# Patient Record
Sex: FEMALE
[De-identification: names, ages, dates, MRNs, and addresses within clinical notes are randomized per-mention and may not be internally consistent; named-entity substitution may affect disease eponyms.]

---

## 2022-08-08 ENCOUNTER — RX ONLY (RX ONLY)
Age: 42
End: 2022-08-08

## 2022-08-08 ENCOUNTER — OFFICE (OUTPATIENT)
Dept: URBAN - METROPOLITAN AREA CLINIC 76 | Facility: CLINIC | Age: 42
Setting detail: OPHTHALMOLOGY
End: 2022-08-08
Payer: COMMERCIAL

## 2022-08-08 DIAGNOSIS — H43.393: ICD-10-CM

## 2022-08-08 DIAGNOSIS — H16.223: ICD-10-CM

## 2022-08-08 DIAGNOSIS — H52.13: ICD-10-CM

## 2022-08-08 PROCEDURE — 92015 DETERMINE REFRACTIVE STATE: CPT | Performed by: OPHTHALMOLOGY

## 2022-08-08 PROCEDURE — 92004 COMPRE OPH EXAM NEW PT 1/>: CPT | Performed by: OPHTHALMOLOGY

## 2022-08-08 ASSESSMENT — KERATOMETRY
OD_K2POWER_DIOPTERS: 42.00
OD_K1POWER_DIOPTERS: 41.75
OS_AXISANGLE_DEGREES: 83
OS_K2POWER_DIOPTERS: 42.25
OD_AXISANGLE_DEGREES: 92
OS_K1POWER_DIOPTERS: 41.75

## 2022-08-08 ASSESSMENT — REFRACTION_MANIFEST
OS_CYLINDER: -0.75
OD_VA1: 20/20
OD_SPHERE: -0.75
OS_SPHERE: -0.50
OS_AXIS: 85
OS_VA1: 20/20
OD_CYLINDER: -0.75
OD_AXIS: 90

## 2022-08-08 ASSESSMENT — SUPERFICIAL PUNCTATE KERATITIS (SPK)
OS_SPK: 1+
OD_SPK: 1+

## 2022-08-08 ASSESSMENT — TONOMETRY
OD_IOP_MMHG: 15
OS_IOP_MMHG: 17

## 2022-08-08 ASSESSMENT — SPHEQUIV_DERIVED
OS_SPHEQUIV: -0.875
OS_SPHEQUIV: -0.875
OD_SPHEQUIV: -1.125
OD_SPHEQUIV: -1.125

## 2022-08-08 ASSESSMENT — AXIALLENGTH_DERIVED
OD_AL: 24.6746
OS_AL: 24.5188
OD_AL: 24.6746
OS_AL: 24.5188

## 2022-08-08 ASSESSMENT — REFRACTION_AUTOREFRACTION
OS_AXIS: 84
OS_CYLINDER: -0.75
OD_CYLINDER: -0.75
OD_SPHERE: -0.75
OD_AXIS: 88
OS_SPHERE: -0.50

## 2022-08-08 ASSESSMENT — REFRACTION_CURRENTRX
OS_AXIS: 89
OD_SPHERE: -0.50
OD_AXIS: 88
OD_CYLINDER: -0.75
OS_SPHERE: -0.50
OS_OVR_VA: 20/
OS_CYLINDER: -0.75
OD_OVR_VA: 20/

## 2022-08-08 ASSESSMENT — VISUAL ACUITY
OS_BCVA: 20/20
OD_BCVA: 20/20

## 2022-08-08 ASSESSMENT — CONFRONTATIONAL VISUAL FIELD TEST (CVF)
OD_FINDINGS: FULL
OS_FINDINGS: FULL

## 2022-08-08 ASSESSMENT — TEAR BREAK UP TIME (TBUT)
OS_TBUT: 1+
OD_TBUT: 1+

## 2022-11-23 PROBLEM — Z00.00 ENCOUNTER FOR PREVENTIVE HEALTH EXAMINATION: Status: ACTIVE | Noted: 2022-11-23

## 2022-12-05 ENCOUNTER — APPOINTMENT (OUTPATIENT)
Dept: PULMONOLOGY | Facility: CLINIC | Age: 42
End: 2022-12-05

## 2022-12-06 ENCOUNTER — APPOINTMENT (OUTPATIENT)
Dept: PULMONOLOGY | Facility: CLINIC | Age: 42
End: 2022-12-06

## 2022-12-06 ENCOUNTER — NON-APPOINTMENT (OUTPATIENT)
Age: 42
End: 2022-12-06

## 2022-12-06 VITALS
HEART RATE: 75 BPM | DIASTOLIC BLOOD PRESSURE: 58 MMHG | SYSTOLIC BLOOD PRESSURE: 96 MMHG | OXYGEN SATURATION: 98 % | HEIGHT: 62.99 IN | BODY MASS INDEX: 21.79 KG/M2 | TEMPERATURE: 97.9 F | WEIGHT: 123 LBS

## 2022-12-06 DIAGNOSIS — J31.0 CHRONIC RHINITIS: ICD-10-CM

## 2022-12-06 DIAGNOSIS — Z91.89 OTHER SPECIFIED PERSONAL RISK FACTORS, NOT ELSEWHERE CLASSIFIED: ICD-10-CM

## 2022-12-06 DIAGNOSIS — R06.02 SHORTNESS OF BREATH: ICD-10-CM

## 2022-12-06 DIAGNOSIS — R05.9 COUGH, UNSPECIFIED: ICD-10-CM

## 2022-12-06 PROCEDURE — 99204 OFFICE O/P NEW MOD 45 MIN: CPT

## 2022-12-06 RX ORDER — BUDESONIDE 1 MG/2ML
1 INHALANT ORAL TWICE DAILY
Qty: 2 | Refills: 2 | Status: ACTIVE | COMMUNITY
Start: 2022-12-06 | End: 1900-01-01

## 2022-12-06 NOTE — REVIEW OF SYSTEMS
[Nasal Congestion] : nasal congestion [Cough] : cough [Headache] : headache [Negative] : HEENT [Dyspnea] : dyspnea

## 2022-12-07 PROBLEM — Z86.79 PERSONAL HISTORY OF OTHER DISEASES OF THE CIRCULATORY SYSTEM: Chronic | Status: ACTIVE | Noted: 2022-11-29

## 2022-12-07 PROBLEM — Z86.11 PERSONAL HISTORY OF TUBERCULOSIS: Chronic | Status: ACTIVE | Noted: 2022-11-29

## 2022-12-07 PROBLEM — J84.10 PULMONARY FIBROSIS, UNSPECIFIED: Chronic | Status: ACTIVE | Noted: 2022-11-29

## 2022-12-07 PROBLEM — K51.90 ULCERATIVE COLITIS, UNSPECIFIED, WITHOUT COMPLICATIONS: Chronic | Status: ACTIVE | Noted: 2022-11-29

## 2022-12-07 PROBLEM — E80.6 OTHER DISORDERS OF BILIRUBIN METABOLISM: Chronic | Status: ACTIVE | Noted: 2022-11-29

## 2022-12-07 NOTE — PHYSICAL EXAM
[No Acute Distress] : no acute distress [Normal Oropharynx] : normal oropharynx [Normal Appearance] : normal appearance [No Neck Mass] : no neck mass [Normal Rate/Rhythm] : normal rate/rhythm [Normal S1, S2] : normal s1, s2 [No Murmurs] : no murmurs [No Resp Distress] : no resp distress [No Abnormalities] : no abnormalities [Benign] : benign [Normal Gait] : normal gait [No Clubbing] : no clubbing [No Cyanosis] : no cyanosis [No Edema] : no edema [FROM] : FROM [Normal Color/ Pigmentation] : normal color/ pigmentation [No Focal Deficits] : no focal deficits [Oriented x3] : oriented x3 [Normal Affect] : normal affect [TextBox_11] : mild erythema in the posterior pharynx, no exudates, no cobblestoning noted. Nasal mucosa is narrowed with erythema and discharge. Mild hypertrophy of middle turbinate, no polyps. Tonsils/adenoids are enlarged on one side [TextBox_68] : On forceful exhalation, there was a wheeze noted on the right side

## 2022-12-07 NOTE — HISTORY OF PRESENT ILLNESS
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 Brumfield\par takes vitamin D, tumeric, aloe vera herbs (supplements for liver, stomach)\par She is going to see a liver specialist referred by her PCP\par She wakes up 3 times during the night due to palpitations. She does not feel that she snores\par She sleeps alone\par She was concerned this was a cardiac issue. She wore a holter monitor, low heart rate\par She feels like she is forgetting to breath\par She has a pain in her chest, substernal\par She had COVID in July 2022. She had pain on the right side. Her symptoms are worse since having COVID, but were there before COVID\par She has reflux. She has taken medication before for reflux, which has helped\par She was taking pills for pain (cyst in her hip)\par She feels a lump in her throat\par She practices [employer].\par She saw a functional doctor in [place] [facility]. 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 Family history of stomach stomach cancer, both sides (grandparents)\par She believes her mother may have rheumatoid arthritis\par She bikes for 40 minutes about 5 times per week. Her HR goes from 120s-160s.

## 2022-12-07 NOTE — ADDENDUM
[FreeTextEntry1] : I, Dr. Mich Fisher, personally performed the evaluation and management services for this new patient.  This evaluation and management includes conducting the initial interview of the patient, performing the initial examination, assessing all medical conditions, reviewing all medical records available and establishing the plan of comprehensive care.  Today, my ACP, Ms. Angella Chang, participated in the process of patient evaluation and management.  She and I have discussed the management of the patient, and she understands the plan moving forwards

## 2022-12-07 NOTE — DISCUSSION/SUMMARY
[FreeTextEntry1] : ATTENDING'S SUMMARY:\par 12-6-22:\par no pallor, no icterus\par no JVD, no hepatojugular reflux, no HSM\par no cervical adenopathy, no supraclavicular adenopathy, mild erythema in the posterior pharynx, no exudates, no cobblestoning noted. Nasal mucosa is narrowed with erythema and discharge. Mild hypertrophy of middle turbinate, no polyps. Tonsils/adenoids are enlarged on one side. Mallampati IV\par normal s1/s2, no murmurs, rubs or gallops\par good air entry bilaterally, no wheezing, rhonchi or crackles. On forceful exhalation, there was a wheeze noted on the right side\par no cyanosis, no clubbing, no articular manifestations, no thickening of the skin\par no pedal edema

## 2022-12-07 NOTE — ASSESSMENT
[FreeTextEntry1] : 12-6-22:\par \par It was a pleasure to meet Robyn today in consultation. Her respiratory issues are summarized:\par \par 1. Cough\par Robyn complains of coughing, substernal chest pain and difficulty breathing. Her exercise tolerance is excellent, able to bike 6 miles in 40 minutes.\par \par Possible causes include:\par a. GERD. She complains of heartburn, lump in her throat and cough especially at night. She had worsening symptoms while she was on pain medication. It sounds like she was on a PPI for a period of time, which helped her symptoms.\par b. Asthma. She was diagnosed with asthma at age 18. She was on inhalers until age 21. She reports having allergies to foods. Denies triggers to changes in weather/seasons. Her pulmonary function tests are normal. There is no obstructive lung defect. We will order a methacholine challenge to confirm a diagnosis of asthma.\par c. Rhinitis. Nasal mucosa is narrowed with erythema and discharge. Mild hypertrophy of middle turbinate, no polyps. She currently uses nasal rinse with Flonase. We will add budesonide to nasal rinse twice daily. She can stop using Flonase\par d. Pulmonary fibrosis. She had COVID in July 2022. There is nothing on the CT to suggest fibrosis or scarring.\par \par Plan:\par - Methacholine challenge\par - When she comes in for MCT, check RAST, IgE level, eosinophil count\par - Although unlikely, we will check shortness of breath labs to rule out blood clot, diastolic dysfunction (pro-BNP, D-dimer)\par - If the above work up does not give an etiology her cough, we will plan to start her on Pantoprazole 40mg once a day for treatment of reflux\par - Referred to ENT, Dr. Yip for further evaluation of rhinitis\par \par 2. Pulmonary nodule\par Robyn has a 2mm lung nodule in the right middle lobe on chest CT done 11/11/22. She is a non smoker and does not have a family history of lung cancer. She does not require further follow up\par \par 3. Rule out obstructive sleep apnea\par She wakes up during the night with palpitations. She has a Mallampati IV. [place] is 20. We will order a home sleep study to evaluate for sleep apnea\par \par Return to clinic: 3 months

## 2022-12-07 NOTE — PROCEDURE
[FreeTextEntry1] : PFT 10/1/22\par FVC: 4.69 L (131%)--> 4.36 L (122%)  \par FEV1: 3.64 L (125%)--> 3.48 L (119%)  \par FEV1/FVC: 94%--> 97%\par [id number]%: 3.31 L/s (108%)--> 3.45 L/s (112%)\par T.91 L (122%)\par RV/T%\par DLCO: 28.1 (114%)

## 2022-12-07 NOTE — CONSULT LETTER
[Dear  ___] : Dear ~TIEN, [Consult Letter:] : I had the pleasure of evaluating your patient, [unfilled]. [Please see my note below.] : Please see my note below. [Consult Closing:] : Thank you very much for allowing me to participate in the care of this patient.  If you have any questions, please do not hesitate to contact me. [FreeTextEntry2] : Dr. Skylar Brumfield

## 2022-12-20 ENCOUNTER — OUTPATIENT (OUTPATIENT)
Dept: OUTPATIENT SERVICES | Facility: HOSPITAL | Age: 42
LOS: 1 days | Discharge: HOME | End: 2022-12-20

## 2022-12-20 DIAGNOSIS — R05.9 COUGH, UNSPECIFIED: ICD-10-CM

## 2022-12-20 PROCEDURE — 94070 EVALUATION OF WHEEZING: CPT | Mod: 26

## 2022-12-27 ENCOUNTER — APPOINTMENT (OUTPATIENT)
Dept: OTOLARYNGOLOGY | Facility: CLINIC | Age: 42
End: 2022-12-27

## 2022-12-27 VITALS
HEIGHT: 62 IN | DIASTOLIC BLOOD PRESSURE: 58 MMHG | HEART RATE: 56 BPM | TEMPERATURE: 98 F | BODY MASS INDEX: 23 KG/M2 | SYSTOLIC BLOOD PRESSURE: 95 MMHG | WEIGHT: 125 LBS

## 2022-12-27 DIAGNOSIS — G47.30 SLEEP APNEA, UNSPECIFIED: ICD-10-CM

## 2022-12-27 PROCEDURE — 99204 OFFICE O/P NEW MOD 45 MIN: CPT

## 2022-12-28 ENCOUNTER — LABORATORY RESULT (OUTPATIENT)
Age: 42
End: 2022-12-28

## 2022-12-28 PROBLEM — G47.30 SLEEP-DISORDERED BREATHING: Status: ACTIVE | Noted: 2022-12-28

## 2022-12-28 RX ORDER — AMITRIPTYLINE HYDROCHLORIDE 10 MG/1
10 TABLET, FILM COATED ORAL
Qty: 30 | Refills: 0 | Status: ACTIVE | COMMUNITY
Start: 2022-07-25

## 2022-12-28 RX ORDER — TIOTROPIUM BROMIDE INHALATION SPRAY 3.12 UG/1
2.5 SPRAY, METERED RESPIRATORY (INHALATION)
Qty: 4 | Refills: 0 | Status: ACTIVE | COMMUNITY
Start: 2022-12-12

## 2022-12-28 RX ORDER — SULINDAC 200 MG/1
200 TABLET ORAL
Qty: 60 | Refills: 0 | Status: ACTIVE | COMMUNITY
Start: 2022-07-26

## 2022-12-28 NOTE — ASSESSMENT
[FreeTextEntry1] : 42F here for initial evaluation. She reports long standing 'difficulty breathing.' This is constant and she feels it both while breathing through her nose and mouth. She does report nasal congestion/obstruction of which the right side is always worse in addition to headache. She feels her sleeping is not good quality and says she snores with apneic and gasping events. She had seen specialist in [facility] who told her she has several anatomic reasons for her complaints and they wanted to pursue surgery? She is scheduled for sleep test tomorrow. I reviewed the CBCT done 11/2022, though imaging is limited, but shows patent paranasal sinuses with only mild septal deviation and inferior turbinate hypertrophy. Complete and comprehensive head and neck exam, including nasal endoscopy, is unremarkable - posterior oropharynx is widely patent.\par Given history and complaints, seems like she has some degree of sleep disordered breathing/sleep apnea, though her body habitus does not make that obvious. She is scheduled for sleep study tomorrow; I'll f/u results. In interim, sleep hygiene discussed.\par Nasal congestion due to turbinate hypertrophy/rhinitis -> recommend nasal steroid sprays like flonase; do not need budesonide rinses. Right side worse due to septal deviation. I will f/u better imaging in the interim.

## 2022-12-28 NOTE — HISTORY OF PRESENT ILLNESS
[de-identified] : 42F here for initial evaluation.\par \par She reports long standing 'difficulty breathing.' This is constant and she feels it both while breathing through her nose and mouth. She does report nasal congestion/obstruction of which the right side is always worse in addition to headache.\par She feels her sleeping is not good quality and says she snores with apneic and gasping events. [facility] told her she has several anatomic reasons for her complaints and they wanted to pursue surgery?\par She is scheduled for sleep test tomorrow.\par \par CBCT 11/2022 (I reviewed limited imaging)-\par -patent paranasal sinuses\par -mild right septal deviation\par -inferior turbinate hypertrophy\par \par ROS otherwise unremarkable.

## 2022-12-28 NOTE — CONSULT LETTER
[Dear  ___] : Dear  [unfilled], [Courtesy Letter:] : I had the pleasure of seeing your patient, [unfilled], in my office today. [Consult Closing:] : Thank you very much for allowing me to participate in the care of this patient.  If you have any questions, please do not hesitate to contact me. [Sincerely,] : Sincerely, [FreeTextEntry3] : Ramior Yip MD\par Department of Otolaryngology - Head and Neck Surgery\par [facility]

## 2022-12-28 NOTE — PHYSICAL EXAM
[FreeTextEntry1] : thin body habitus [de-identified] : normal neck circumference [Nasal Endoscopy Performed] : nasal endoscopy was performed, see procedure section for findings [Midline] : trachea located in midline position [de-identified] : 1+ symmetric no stones or exudates [de-identified] : posterior opx widely patent [Normal] : no rashes

## 2022-12-29 ENCOUNTER — APPOINTMENT (OUTPATIENT)
Dept: SLEEP CENTER | Facility: HOME HEALTH | Age: 42
End: 2022-12-29
Payer: MEDICAID

## 2022-12-29 ENCOUNTER — OUTPATIENT (OUTPATIENT)
Dept: OUTPATIENT SERVICES | Facility: HOSPITAL | Age: 42
LOS: 1 days | End: 2022-12-29
Payer: COMMERCIAL

## 2022-12-29 ENCOUNTER — NON-APPOINTMENT (OUTPATIENT)
Age: 42
End: 2022-12-29

## 2022-12-29 DIAGNOSIS — G47.33 OBSTRUCTIVE SLEEP APNEA (ADULT) (PEDIATRIC): ICD-10-CM

## 2022-12-29 LAB
ALBUMIN SERPL ELPH-MCNC: 4.8 G/DL
ALP BLD-CCNC: 63 U/L
ALT SERPL-CCNC: 12 U/L
ANION GAP SERPL CALC-SCNC: 18 MMOL/L
AST SERPL-CCNC: 16 U/L
BASOPHILS # BLD AUTO: 0.04 K/UL
BASOPHILS NFR BLD AUTO: 0.5 %
BILIRUB SERPL-MCNC: 1.6 MG/DL
BUN SERPL-MCNC: 22 MG/DL
CALCIUM SERPL-MCNC: 9.6 MG/DL
CHLORIDE SERPL-SCNC: 100 MMOL/L
CO2 SERPL-SCNC: 22 MMOL/L
CREAT SERPL-MCNC: 1.12 MG/DL
DEPRECATED D DIMER PPP IA-ACNC: <150 NG/ML DDU
EGFR: 63 ML/MIN/1.73M2
EOSINOPHIL # BLD AUTO: 0.25 K/UL
EOSINOPHIL NFR BLD AUTO: 3.4 %
GLUCOSE SERPL-MCNC: 66 MG/DL
HCT VFR BLD CALC: 37.9 %
HGB BLD-MCNC: 12.2 G/DL
IMM GRANULOCYTES NFR BLD AUTO: 0.1 %
LYMPHOCYTES # BLD AUTO: 2.06 K/UL
LYMPHOCYTES NFR BLD AUTO: 27.7 %
MAN DIFF?: NORMAL
MCHC RBC-ENTMCNC: 29.2 PG
MCHC RBC-ENTMCNC: 32.2 GM/DL
MCV RBC AUTO: 90.7 FL
MONOCYTES # BLD AUTO: 0.49 K/UL
MONOCYTES NFR BLD AUTO: 6.6 %
NEUTROPHILS # BLD AUTO: 4.58 K/UL
NEUTROPHILS NFR BLD AUTO: 61.7 %
NT-PROBNP SERPL-MCNC: 101 PG/ML
NT-PROBNP SERPL-MCNC: 99 PG/ML
PLATELET # BLD AUTO: 265 K/UL
POTASSIUM SERPL-SCNC: 4.4 MMOL/L
PROT SERPL-MCNC: 7.2 G/DL
RBC # BLD: 4.18 M/UL
RBC # FLD: 14 %
RHEUMATOID FACT SER QL: <10 IU/ML
SODIUM SERPL-SCNC: 140 MMOL/L
WBC # FLD AUTO: 7.43 K/UL

## 2022-12-29 PROCEDURE — 95800 SLP STDY UNATTENDED: CPT

## 2022-12-29 PROCEDURE — 95800 SLP STDY UNATTENDED: CPT | Mod: 26

## 2022-12-30 LAB
ANA SER IF-ACNC: NEGATIVE
CH50 SERPL-MCNC: 57 U/ML

## 2023-01-01 ENCOUNTER — NON-APPOINTMENT (OUTPATIENT)
Age: 43
End: 2023-01-01

## 2023-01-03 ENCOUNTER — NON-APPOINTMENT (OUTPATIENT)
Age: 43
End: 2023-01-03

## 2023-01-03 LAB
DEPRECATED ENGL PLANTAIN IGE RAST QL: 0
DEPRECATED FIREBUSH IGE RAST QL: 0
DEPRECATED GOOSEFOOT IGE RAST QL: 0
DEPRECATED MARSH ELDER IGE RAST QL: 0
DEPRECATED SALTWORT IGE RAST QL: 0
ENGL PLANTAIN IGE QN: <0.1 KUA/L
FIREBUSH IGE QN: <0.1 KUA/L
GOOSEFOOT IGE QN: <0.1 KUA/L
MARSH ELDER IGE QN: <0.1 KUA/L
SALTWORT IGE QN: <0.1 KUA/L
TOTAL IGE SMQN RAST: 15 KU/L

## 2023-01-06 ENCOUNTER — NON-APPOINTMENT (OUTPATIENT)
Age: 43
End: 2023-01-06

## 2023-02-23 NOTE — PHYSICAL EXAM
[TextBox_11] : mild erythema in the posterior pharynx, no exudates, no cobblestoning noted. Nasal mucosa is narrowed with erythema and discharge. Mild hypertrophy of middle turbinate, no polyps. Tonsils/adenoids are enlarged on one side [TextBox_68] : On forceful exhalation, there was a wheeze noted on the right side

## 2023-02-23 NOTE — DISCUSSION/SUMMARY
[FreeTextEntry1] : ATTENDING'S SUMMARY:\par 3-2-23:\par no pallor, no icterus\par no JVD, no hepatojugular reflux, no HSM\par no cervical adenopathy, no supraclavicular adenopathy, mild erythema in the posterior pharynx, no exudates, no cobblestoning noted. Nasal mucosa is narrowed with erythema and discharge. Mild hypertrophy of middle turbinate, no polyps. Tonsils/adenoids are enlarged on one side. Mallampati IV\par normal s1/s2, no murmurs, rubs or gallops\par good air entry bilaterally, no wheezing, rhonchi or crackles. On forceful exhalation, there was a wheeze noted on the right side\par no cyanosis, no clubbing, no articular manifestations, no thickening of the skin\par no pedal edema

## 2023-02-23 NOTE — PROCEDURE
[FreeTextEntry1] : Home sleep study 22\par No significant sleep disordered breathing was observed.\par \par PFT 10/1/22\par FVC: 4.69 L (131%)--> 4.36 L (122%)  \par FEV1: 3.64 L (125%)--> 3.48 L (119%)  \par FEV1/FVC: 94%--> 97%\par [id number]%: 3.31 L/s (108%)--> 3.45 L/s (112%)\par T.91 L (122%)\par RV/T%\par DLCO: 28.1 (114%)

## 2023-02-23 NOTE — ASSESSMENT
[FreeTextEntry1] : 3-2-23:\par \par It was a pleasure to see Robyn today in follow up. Her respiratory issues are summarized:\par \par 1. Cough\par Robyn complains of coughing, substernal chest pain and difficulty breathing. Her exercise tolerance is excellent, able to bike 6 miles in 40 minutes.\par \par Possible causes include:\par a. GERD. She complains of heartburn, lump in her throat and cough especially at night. She had worsening symptoms while she was on pain medication. It sounds like she was on a PPI for a period of time, which helped her symptoms.\par b. Asthma. She was diagnosed with asthma at age 18. She was on inhalers until age 21. She reports having allergies to foods. Denies triggers to changes in weather/seasons. Her pulmonary function tests are normal. There is no obstructive lung defect. Methacholine challenge test done 12/20/22 did not show evidence of bronchial hyperreactivity to make a diagnosis of asthma. However, a decrease of 15% in the FEV1 was noted at level 5 (16mg/ml) of the challenge. Since she was so symptomatic with the 15% drop in FEV1 (shortness of breath, cough, dizziness), we will trial her on 3 months of LABA/ICS.  RAST, IgE level, eosinophil count were normal\par c. Rhinitis. Nasal mucosa is narrowed with erythema and discharge. Mild hypertrophy of middle turbinate, no polyps. She currently uses nasal rinse with Flonase. We referred her to Dr. Yip. He ordered sinus CT\par d. Pulmonary fibrosis. She had COVID in July 2022. There is nothing on the CT to suggest fibrosis or scarring.\par \par Plan:\par - If the above work up does not give an etiology her cough, we will plan to start her on Pantoprazole 40mg once a day for treatment of reflux\par - Referred to ENT, Dr. Yip for further evaluation of rhinitis\par \par 2. Pulmonary nodule\par Robyn has a 2mm lung nodule in the right middle lobe on chest CT done 11/11/22. She is a non smoker and does not have a family history of lung cancer. She does not require further follow up\par \par 3. Rule out obstructive sleep apnea\par She wakes up during the night with palpitations. She has a Mallampati IV. [place] is 20. Home sleep study done 12/28/22 did not show significant sleep disordered breathing.\par \par Return to clinic: 3 months

## 2023-02-23 NOTE — HISTORY OF PRESENT ILLNESS
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 Brumfield\par takes vitamin D, tumeric, aloe vera herbs (supplements for liver, stomach)\par She is going to see a liver specialist referred by her PCP\par She wakes up 3 times during the night due to palpitations. She does not feel that she snores\par She sleeps alone\par She was concerned this was a cardiac issue. She wore a holter monitor, low heart rate\par She feels like she is forgetting to breath\par She has a pain in her chest, substernal\par She had COVID in July 2022. She had pain on the right side. Her symptoms are worse since having COVID, but were there before COVID\par She has reflux. She has taken medication before for reflux, which has helped\par She was taking pills for pain (cyst in her hip)\par She feels a lump in her throat\par She practices [employer].\par She saw a functional doctor in [facility]. 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 Family history of stomach stomach cancer, both sides (grandparents)\par She believes her mother may have rheumatoid arthritis\par She bikes for 40 minutes about 5 times per week. Her HR goes from 120s-160s.\par \par 3-2-23:

## 2023-02-26 RX ORDER — FLUTICASONE FUROATE AND VILANTEROL TRIFENATATE 200; 25 UG/1; UG/1
200-25 POWDER RESPIRATORY (INHALATION)
Qty: 1 | Refills: 1 | Status: ACTIVE | COMMUNITY
Start: 2022-12-22 | End: 1900-01-01

## 2023-03-02 ENCOUNTER — NON-APPOINTMENT (OUTPATIENT)
Age: 43
End: 2023-03-02

## 2023-03-02 ENCOUNTER — APPOINTMENT (OUTPATIENT)
Dept: PULMONOLOGY | Facility: CLINIC | Age: 43
End: 2023-03-02

## 2023-03-28 ENCOUNTER — APPOINTMENT (OUTPATIENT)
Dept: OTOLARYNGOLOGY | Facility: CLINIC | Age: 43
End: 2023-03-28

## 2024-10-15 ENCOUNTER — NON-APPOINTMENT (OUTPATIENT)
Age: 44
End: 2024-10-15

## 2024-10-17 ENCOUNTER — NON-APPOINTMENT (OUTPATIENT)
Age: 44
End: 2024-10-17

## 2024-10-22 ENCOUNTER — NON-APPOINTMENT (OUTPATIENT)
Age: 44
End: 2024-10-22

## 2024-10-22 ENCOUNTER — APPOINTMENT (OUTPATIENT)
Dept: PULMONOLOGY | Facility: CLINIC | Age: 44
End: 2024-10-22
Payer: MEDICAID

## 2024-10-22 VITALS
OXYGEN SATURATION: 98 % | TEMPERATURE: 98.5 F | DIASTOLIC BLOOD PRESSURE: 58 MMHG | HEIGHT: 62 IN | RESPIRATION RATE: 12 BRPM | HEART RATE: 69 BPM | SYSTOLIC BLOOD PRESSURE: 94 MMHG | WEIGHT: 128 LBS | BODY MASS INDEX: 23.55 KG/M2

## 2024-10-22 DIAGNOSIS — R06.02 SHORTNESS OF BREATH: ICD-10-CM

## 2024-10-22 DIAGNOSIS — R10.11 RIGHT UPPER QUADRANT PAIN: ICD-10-CM

## 2024-10-22 DIAGNOSIS — R05.9 COUGH, UNSPECIFIED: ICD-10-CM

## 2024-10-22 PROCEDURE — 94727 GAS DIL/WSHOT DETER LNG VOL: CPT

## 2024-10-22 PROCEDURE — 94729 DIFFUSING CAPACITY: CPT | Mod: 59

## 2024-10-22 PROCEDURE — 94010 BREATHING CAPACITY TEST: CPT

## 2024-10-22 PROCEDURE — 99215 OFFICE O/P EST HI 40 MIN: CPT

## 2024-10-22 PROCEDURE — 94618 PULMONARY STRESS TESTING: CPT | Mod: 59

## 2024-10-22 PROCEDURE — 94010 BREATHING CAPACITY TEST: CPT | Mod: 59

## 2024-10-22 PROCEDURE — G2211 COMPLEX E/M VISIT ADD ON: CPT | Mod: NC

## 2024-10-23 LAB
ALBUMIN SERPL ELPH-MCNC: 4.5 G/DL
ALP BLD-CCNC: 60 U/L
ALT SERPL-CCNC: 17 U/L
AMYLASE/CREAT SERPL: 132 U/L
ANION GAP SERPL CALC-SCNC: 14 MMOL/L
AST SERPL-CCNC: 18 U/L
BASOPHILS # BLD AUTO: 0.04 K/UL
BASOPHILS NFR BLD AUTO: 0.7 %
BILIRUB SERPL-MCNC: 2 MG/DL
BUN SERPL-MCNC: 8 MG/DL
CALCIUM SERPL-MCNC: 9.7 MG/DL
CHLORIDE SERPL-SCNC: 103 MMOL/L
CO2 SERPL-SCNC: 22 MMOL/L
CREAT SERPL-MCNC: 0.86 MG/DL
EGFR: 86 ML/MIN/1.73M2
EOSINOPHIL # BLD AUTO: 0.26 K/UL
EOSINOPHIL NFR BLD AUTO: 4.8 %
GLUCOSE SERPL-MCNC: 128 MG/DL
HCT VFR BLD CALC: 37.5 %
HGB BLD-MCNC: 12 G/DL
IMM GRANULOCYTES NFR BLD AUTO: 0.2 %
LPL SERPL-CCNC: 25 U/L
LYMPHOCYTES # BLD AUTO: 1.88 K/UL
LYMPHOCYTES NFR BLD AUTO: 34.5 %
MAN DIFF?: NORMAL
MCHC RBC-ENTMCNC: 28.8 PG
MCHC RBC-ENTMCNC: 32 GM/DL
MCV RBC AUTO: 90.1 FL
MONOCYTES # BLD AUTO: 0.42 K/UL
MONOCYTES NFR BLD AUTO: 7.7 %
NEUTROPHILS # BLD AUTO: 2.84 K/UL
NEUTROPHILS NFR BLD AUTO: 52.1 %
PLATELET # BLD AUTO: 261 K/UL
POTASSIUM SERPL-SCNC: 3.8 MMOL/L
PROT SERPL-MCNC: 7 G/DL
RBC # BLD: 4.16 M/UL
RBC # FLD: 13.9 %
SODIUM SERPL-SCNC: 139 MMOL/L
WBC # FLD AUTO: 5.45 K/UL

## 2024-10-25 LAB — TOTAL IGE SMQN RAST: 27 KU/L

## 2024-11-04 ENCOUNTER — TRANSCRIPTION ENCOUNTER (OUTPATIENT)
Age: 44
End: 2024-11-04

## 2024-11-04 ENCOUNTER — RESULT REVIEW (OUTPATIENT)
Age: 44
End: 2024-11-04

## 2024-11-08 ENCOUNTER — APPOINTMENT (OUTPATIENT)
Dept: ULTRASOUND IMAGING | Facility: HOSPITAL | Age: 44
End: 2024-11-08
Payer: MEDICAID

## 2024-11-08 ENCOUNTER — OUTPATIENT (OUTPATIENT)
Dept: OUTPATIENT SERVICES | Facility: HOSPITAL | Age: 44
LOS: 1 days | End: 2024-11-08

## 2024-11-08 PROCEDURE — 76705 ECHO EXAM OF ABDOMEN: CPT | Mod: 26

## 2024-11-08 PROCEDURE — 76705 ECHO EXAM OF ABDOMEN: CPT

## 2024-12-03 ENCOUNTER — APPOINTMENT (OUTPATIENT)
Dept: SLEEP CENTER | Facility: HOME HEALTH | Age: 44
End: 2024-12-03

## 2025-02-18 ENCOUNTER — APPOINTMENT (OUTPATIENT)
Dept: PULMONOLOGY | Facility: CLINIC | Age: 45
End: 2025-02-18

## 2025-02-25 ENCOUNTER — OUTPATIENT (OUTPATIENT)
Dept: OUTPATIENT SERVICES | Facility: HOSPITAL | Age: 45
LOS: 1 days | End: 2025-02-25
Payer: MEDICAID

## 2025-02-25 ENCOUNTER — APPOINTMENT (OUTPATIENT)
Dept: SLEEP CENTER | Facility: HOSPITAL | Age: 45
End: 2025-02-25

## 2025-02-25 DIAGNOSIS — G47.33 OBSTRUCTIVE SLEEP APNEA (ADULT) (PEDIATRIC): ICD-10-CM

## 2025-02-25 PROCEDURE — 95810 POLYSOM 6/> YRS 4/> PARAM: CPT

## 2025-02-25 PROCEDURE — 95810 POLYSOM 6/> YRS 4/> PARAM: CPT | Mod: 26

## 2025-03-06 ENCOUNTER — NON-APPOINTMENT (OUTPATIENT)
Age: 45
End: 2025-03-06

## 2025-03-12 ENCOUNTER — APPOINTMENT (OUTPATIENT)
Dept: PULMONOLOGY | Facility: CLINIC | Age: 45
End: 2025-03-12
Payer: MEDICAID

## 2025-03-12 PROCEDURE — 94618 PULMONARY STRESS TESTING: CPT

## 2025-03-12 PROCEDURE — 94727 GAS DIL/WSHOT DETER LNG VOL: CPT

## 2025-03-12 PROCEDURE — 94010 BREATHING CAPACITY TEST: CPT

## 2025-03-12 PROCEDURE — ZZZZZ: CPT

## 2025-03-12 PROCEDURE — 94729 DIFFUSING CAPACITY: CPT

## 2025-03-14 ENCOUNTER — NON-APPOINTMENT (OUTPATIENT)
Age: 45
End: 2025-03-14

## 2025-03-18 ENCOUNTER — NON-APPOINTMENT (OUTPATIENT)
Age: 45
End: 2025-03-18

## 2025-03-18 ENCOUNTER — APPOINTMENT (OUTPATIENT)
Dept: PULMONOLOGY | Facility: CLINIC | Age: 45
End: 2025-03-18
Payer: MEDICAID

## 2025-03-18 VITALS
HEIGHT: 62 IN | TEMPERATURE: 98.1 F | RESPIRATION RATE: 12 BRPM | WEIGHT: 134 LBS | DIASTOLIC BLOOD PRESSURE: 61 MMHG | OXYGEN SATURATION: 98 % | SYSTOLIC BLOOD PRESSURE: 93 MMHG | BODY MASS INDEX: 24.66 KG/M2 | HEART RATE: 65 BPM

## 2025-03-18 DIAGNOSIS — J31.0 CHRONIC RHINITIS: ICD-10-CM

## 2025-03-18 DIAGNOSIS — R05.9 COUGH, UNSPECIFIED: ICD-10-CM

## 2025-03-18 DIAGNOSIS — R06.02 SHORTNESS OF BREATH: ICD-10-CM

## 2025-03-18 PROCEDURE — G2211 COMPLEX E/M VISIT ADD ON: CPT | Mod: NC

## 2025-03-18 PROCEDURE — 99215 OFFICE O/P EST HI 40 MIN: CPT

## 2025-03-18 RX ORDER — BUDESONIDE AND FORMOTEROL FUMARATE DIHYDRATE 80; 4.5 UG/1; UG/1
80-4.5 AEROSOL RESPIRATORY (INHALATION) TWICE DAILY
Qty: 1 | Refills: 5 | Status: ACTIVE | COMMUNITY
Start: 2025-03-18 | End: 1900-01-01

## 2025-03-18 RX ORDER — BUDESONIDE AND FORMOTEROL FUMARATE DIHYDRATE 160; 4.5 UG/1; UG/1
160-4.5 AEROSOL RESPIRATORY (INHALATION) TWICE DAILY
Qty: 10.2 | Refills: 5 | Status: COMPLETED | COMMUNITY
Start: 2025-03-18 | End: 2025-03-18

## 2025-03-19 ENCOUNTER — NON-APPOINTMENT (OUTPATIENT)
Age: 45
End: 2025-03-19

## 2025-03-19 LAB
CRP SERPL-MCNC: <3 MG/L
ERYTHROCYTE [SEDIMENTATION RATE] IN BLOOD BY WESTERGREN METHOD: 2 MM/HR

## 2025-05-02 ENCOUNTER — APPOINTMENT (OUTPATIENT)
Dept: OTOLARYNGOLOGY | Facility: CLINIC | Age: 45
End: 2025-05-02

## 2025-05-02 VITALS
SYSTOLIC BLOOD PRESSURE: 103 MMHG | HEIGHT: 62.99 IN | HEART RATE: 60 BPM | DIASTOLIC BLOOD PRESSURE: 55 MMHG | TEMPERATURE: 96.9 F | WEIGHT: 134 LBS | BODY MASS INDEX: 23.74 KG/M2

## 2025-05-02 DIAGNOSIS — R09.81 NASAL CONGESTION: ICD-10-CM

## 2025-05-02 DIAGNOSIS — G47.33 OBSTRUCTIVE SLEEP APNEA (ADULT) (PEDIATRIC): ICD-10-CM

## 2025-05-02 DIAGNOSIS — Z83.3 FAMILY HISTORY OF DIABETES MELLITUS: ICD-10-CM

## 2025-05-02 PROCEDURE — 99214 OFFICE O/P EST MOD 30 MIN: CPT | Mod: 25

## 2025-05-02 PROCEDURE — 31575 DIAGNOSTIC LARYNGOSCOPY: CPT

## 2025-05-02 RX ORDER — CHROMIUM 200 MCG
TABLET ORAL
Refills: 0 | Status: ACTIVE | COMMUNITY

## 2025-09-18 ENCOUNTER — APPOINTMENT (OUTPATIENT)
Dept: PULMONOLOGY | Facility: CLINIC | Age: 45
End: 2025-09-18